# Patient Record
Sex: FEMALE | Race: WHITE
[De-identification: names, ages, dates, MRNs, and addresses within clinical notes are randomized per-mention and may not be internally consistent; named-entity substitution may affect disease eponyms.]

---

## 2019-07-03 ENCOUNTER — HOSPITAL ENCOUNTER (OUTPATIENT)
Dept: HOSPITAL 53 - M ED | Age: 39
Discharge: HOME | End: 2019-07-03
Attending: OBSTETRICS & GYNECOLOGY
Payer: COMMERCIAL

## 2019-07-03 VITALS — DIASTOLIC BLOOD PRESSURE: 57 MMHG | SYSTOLIC BLOOD PRESSURE: 102 MMHG

## 2019-07-03 VITALS — WEIGHT: 140.3 LBS | HEIGHT: 64 IN | BODY MASS INDEX: 23.95 KG/M2

## 2019-07-03 DIAGNOSIS — O02.1: Primary | ICD-10-CM

## 2019-07-03 LAB
HCT VFR BLD AUTO: 30.3 % (ref 36–47)
HCT VFR BLD AUTO: 36.6 % (ref 36–47)
HGB BLD-MCNC: 10.3 G/DL (ref 12–15.5)
HGB BLD-MCNC: 12.2 G/DL (ref 12–15.5)

## 2019-07-03 PROCEDURE — 86900 BLOOD TYPING SEROLOGIC ABO: CPT

## 2019-07-03 PROCEDURE — 99285 EMERGENCY DEPT VISIT HI MDM: CPT

## 2019-07-03 PROCEDURE — 96361 HYDRATE IV INFUSION ADD-ON: CPT

## 2019-07-03 PROCEDURE — 88305 TISSUE EXAM BY PATHOLOGIST: CPT

## 2019-07-03 PROCEDURE — 86901 BLOOD TYPING SEROLOGIC RH(D): CPT

## 2019-07-03 PROCEDURE — 85018 HEMOGLOBIN: CPT

## 2019-07-03 PROCEDURE — 96360 HYDRATION IV INFUSION INIT: CPT

## 2019-07-03 PROCEDURE — 76856 US EXAM PELVIC COMPLETE: CPT

## 2019-07-03 PROCEDURE — 59820 CARE OF MISCARRIAGE: CPT

## 2019-07-03 PROCEDURE — 85014 HEMATOCRIT: CPT

## 2019-07-03 PROCEDURE — 86850 RBC ANTIBODY SCREEN: CPT

## 2019-07-03 NOTE — REP
PELVIC ULTRASOUND:

 

Real-time sonographic evaluation of the pelvis performed utilizing transabdominal

technique.

 

The urinary bladder is collapsed.  The uterus measures 15.5 x 6.9 x 7.2 cm.  It

is retroflexed.  Endometrial echo complex is markedly thickened and

heterogeneous likely representing retained produces of conception and hemorrhage.

No intrauterine fetus is visualized.

 

The ovaries are not visualized.

 

There is no gross adnexal mass or free fluid.

 

 

Electronically Signed by

Lincoln Li MD 07/03/2019 04:20 P

## 2019-07-05 NOTE — RO
DATE OF PROCEDURE:   07/03/2019

 

PREOPERATIVE DIAGNOSES:

1.  Retained products of conception.

2.  15-week spontaneous miscarriage.

 

POSTOPERATIVE DIAGNOSES:

1.  Retained products of conception.

2.  15-week spontaneous miscarriage.

 

PROCEDURE PERFORMED: Suction dilation and curettage (D and C)/removal of 
retained

products of conception.

 

INTRAOPERATIVE FINDINGS

Retained placenta removed from the lower uterine segment and internal os and the

uterine fundus.

 

SURGEON: Efren Jacobson DO

 

ASSISTANT: None.

 

ANESTHESIA: General via laryngeal mask airway (LMA).

 

SPECIMENS TO PATHOLOGY: Retained products of conception/placenta.

 

ESTIMATED BLOOD LOSS: 500 mL intraoperatively.

 

FLUIDS REPLACED: 500 mL of  lactated Ringer's.

 

DRAINS: In and out catheter. 100 mL urine output.

 

COMPLICATIONS: None.

 

PREOPERATIVE ANTIBIOTICS: Doxycycline 100 mg IV times one.

 

Postoperative bedside transvaginal ultrasound at the conclusion of the case 
revealed an endometrial stripe that

was homogeneous, thin and measuring 0.5 cm.

 

INDICATION:

The patient is a 38-year-old G3, P2-0-1-2 diagnosed this morning with a 15-week

miscarriage.  She already passed the fetus outside of the hospital and brought

this fetus for evaluation.  She has not passed the placenta upon her 
presentation

to the hospital.  The ultrasound revealed a thick endometrial cavity suggestive 
of

retained products of conception/placenta.  She was therefore counseled and

consented on suction D and C/removal of the placenta/products of conception 
prior

to leaving the hospital.  She agreed to this procedure, to be performed under

general anesthesia.

 

DESCRIPTION OF PROCEDURE

The patient was counseled and consented on risks, benefits, indications and

alternatives of the procedure.  Informed consent was obtained.  She was taken to

the operating room with IV running and placed on operating table in dorsal 
supine

position.  General anesthesia was administered and airway secured without any

difficulty.  She was placed in the high lithotomy position.  She was

prepared and draped in a normal sterile fashion.  A time-out was performed per

protocol.  The bladder was drained with in-and-out sterile catheter.  A sterile

speculum was placed with good visualization of the cervix.  A large amount of

blood and blood clot were noted in the vaginal vault.  Ring forceps were used to
grasp

the anterior lip of the cervix.  After this was performed, another set of ring

forceps were used to grasp the placenta and remove these products of conception.

The size #12 Vacurette was placed transcervically into the intrauterine

cavity to the level of the fundus.  Suction was applied and tissue was removed.

The Vacurette was activated until there was minimal tissue and blood return.  
The

Vacurette was removed.  A sharp curettage was performed throughout the cavity.  
No

uterine perforation was evident.

 

The cavity was noted to be clear with tactile evaluation with the curette.  A

gritty texture was noted throughout the cavity indicating no further retained

products of conception.  The Vacurette was placed for one additional time with

minimal tissue and blood return.  Minimal bleeding from the cervical os was

noted.  Transvaginal ultrasound was performed with the findings noted above.

Transvaginal ultrasound did indicate that all products had been removed

successfully and minimal bleeding from the os was noted.  All instruments were

removed from the vagina.  The anterior lip of the cervix was noted to be

hemostatic after removal of the ring forceps.  All instruments were removed and

sponge, instrument counts were correct.  The patient tolerated the entire

procedure well.  She was transferred to the PACU in good and stable condition.

JOSEPH